# Patient Record
Sex: FEMALE | Race: BLACK OR AFRICAN AMERICAN | NOT HISPANIC OR LATINO | Employment: FULL TIME | URBAN - METROPOLITAN AREA
[De-identification: names, ages, dates, MRNs, and addresses within clinical notes are randomized per-mention and may not be internally consistent; named-entity substitution may affect disease eponyms.]

---

## 2023-10-04 ENCOUNTER — HOSPITAL ENCOUNTER (OUTPATIENT)
Dept: RADIOLOGY | Facility: HOSPITAL | Age: 58
Discharge: HOME | End: 2023-10-04
Attending: INTERNAL MEDICINE
Payer: COMMERCIAL

## 2023-10-04 ENCOUNTER — OFFICE VISIT (OUTPATIENT)
Dept: RHEUMATOLOGY | Facility: CLINIC | Age: 58
End: 2023-10-04
Payer: COMMERCIAL

## 2023-10-04 VITALS
HEIGHT: 65 IN | WEIGHT: 178.56 LBS | HEART RATE: 83 BPM | DIASTOLIC BLOOD PRESSURE: 65 MMHG | BODY MASS INDEX: 29.75 KG/M2 | SYSTOLIC BLOOD PRESSURE: 135 MMHG | RESPIRATION RATE: 18 BRPM

## 2023-10-04 DIAGNOSIS — Z79.4 INSULIN DEPENDENT TYPE 2 DIABETES MELLITUS: ICD-10-CM

## 2023-10-04 DIAGNOSIS — M46.1 SACROILIITIS: ICD-10-CM

## 2023-10-04 DIAGNOSIS — E11.9 INSULIN DEPENDENT TYPE 2 DIABETES MELLITUS: ICD-10-CM

## 2023-10-04 DIAGNOSIS — M06.4 UNDIFFERENTIATED INFLAMMATORY ARTHRITIS: Primary | ICD-10-CM

## 2023-10-04 PROBLEM — M19.90 UNDIFFERENTIATED INFLAMMATORY ARTHRITIS: Status: ACTIVE | Noted: 2023-10-04

## 2023-10-04 PROCEDURE — 72202 XR SACROILIAC JOINTS COMPLETE: ICD-10-PCS | Mod: 26,,, | Performed by: RADIOLOGY

## 2023-10-04 PROCEDURE — 99244 OFF/OP CNSLTJ NEW/EST MOD 40: CPT | Mod: S$GLB,,, | Performed by: INTERNAL MEDICINE

## 2023-10-04 PROCEDURE — 99999 PR PBB SHADOW E&M-EST. PATIENT-LVL IV: ICD-10-PCS | Mod: PBBFAC,,, | Performed by: INTERNAL MEDICINE

## 2023-10-04 PROCEDURE — 99999 PR PBB SHADOW E&M-EST. PATIENT-LVL IV: CPT | Mod: PBBFAC,,, | Performed by: INTERNAL MEDICINE

## 2023-10-04 PROCEDURE — 99244 PR OFFICE CONSULTATION,LEVEL IV: ICD-10-PCS | Mod: S$GLB,,, | Performed by: INTERNAL MEDICINE

## 2023-10-04 PROCEDURE — 72202 X-RAY EXAM SI JOINTS 3/> VWS: CPT | Mod: 26,,, | Performed by: RADIOLOGY

## 2023-10-04 PROCEDURE — 72202 X-RAY EXAM SI JOINTS 3/> VWS: CPT | Mod: TC

## 2023-10-04 NOTE — PROGRESS NOTES
RHEUMATOLOGY CLINIC INITIAL VISIT    Reason for consult:-  Elevated C-reactive protein and sed rate.  Joint pain.  Back pain.  Chief complaints, HPI, ROS, EXAM, Assessment & Plans:-  Brissa Higginbotham a 58 y.o. pleasant female comes in with worsening back pain and joint pain.  Longstanding history of lower back pain without associated radiculopathy symptoms.  Have been noticing worsening diffuse pain involving small and large joints for the past year.  Workup showed elevated sed rate and CRP and was referred here for further workup.  No photosensitive malar rash, sicca syndrome or Raynaud's phenomenon.  Both mechanical and inflammatory back pain present.  No history of uveitis.  No history of sarcoidosis.  Rheumatological review of system negative otherwise.  Physical examination shows severe tenderness of soft tissues and joints.  No significant synovitis of small joints.    1. Undifferentiated inflammatory arthritis    2. Sacroiliitis    3. Insulin dependent type 2 diabetes mellitus      Problem List Items Addressed This Visit       Undifferentiated inflammatory arthritis - Primary    Relevant Orders    MAU Screen w/Reflex    CBC Auto Differential    Cyclic Citrullinated Peptide Antibody, IgG    Comprehensive Metabolic Panel    C-Reactive Protein    Sedimentation rate    Rheumatoid Factor    HLA B27 ANTIGEN    HEMOGLOBIN A1C    Sacroiliitis    Relevant Orders    MAU Screen w/Reflex    CBC Auto Differential    Cyclic Citrullinated Peptide Antibody, IgG    Comprehensive Metabolic Panel    C-Reactive Protein    Sedimentation rate    Rheumatoid Factor    HLA B27 ANTIGEN    X-Ray Sacroiliac Joints Complete (Completed)    Insulin dependent type 2 diabetes mellitus    Relevant Orders    HEMOGLOBIN A1C       Inflammatory polyarthralgia associated with mechanical and inflammatory back pain.  Recently rheumatoid to have elevated inflammatory markers.  External records reviewed.  MRI shows degenerative disc disease of lower  lumbar spine.  Repeat inflammatory markers.  Check x-ray of sacroiliac joint to look for any deformity.  Consider MRI of sacroiliac joint if any abnormality found on x-ray.  I have explained all of the above in detail and the patient understands all of the current recommendation(s). I have answered all questions to the best of my ability and to their complete satisfaction.           # Follow up in about 4 weeks (around 11/1/2023).      History reviewed. No pertinent past medical history.    History reviewed. No pertinent surgical history.     Social History     Tobacco Use    Smoking status: Never   Substance Use Topics    Alcohol use: Yes     Alcohol/week: 0.0 standard drinks of alcohol     Comment: Occasionally, mainly on weekends    Drug use: No       Family History   Problem Relation Age of Onset    Diabetes Mother     Hypertension Mother     Stroke Mother     Diabetes Daughter     Diabetes Maternal Aunt     Breast cancer Sister        Review of patient's allergies indicates:   Allergen Reactions    Doxycycline Itching, Swelling and Rash    Zithromax [azithromycin] Hives, Itching and Swelling       Medication List with Changes/Refills   Current Medications    INSULIN  UNIT/ML INJECTION    Inject into the skin 2 (two) times daily before meals.    INSULIN NPH-INSULIN REGULAR, 70/30, 100 UNIT/ML (70-30) INJECTION    Humulin 70/30 U-100 Insulin 100 unit/mL subcutaneous suspension   Inject 30 units twice a day by subcutaneous route.    NYSTATIN (MYCOSTATIN) CREAM    Apply topically 2 (two) times daily.    TRUE METRIX GLUCOSE TEST STRIP STRP    USE 1 STRIP TO CHECK GLUCOSE 3 TIMES A DAY         Thank you for allowing me to participate in the care ofBrissa Lopez.    Disclaimer: This note was prepared using voice recognition system and is likely to have sound alike errors and is not proof read.  Please call me with any questions.

## 2023-10-23 ENCOUNTER — TELEPHONE (OUTPATIENT)
Dept: RHEUMATOLOGY | Facility: CLINIC | Age: 58
End: 2023-10-23
Payer: COMMERCIAL

## 2023-10-23 ENCOUNTER — NURSE TRIAGE (OUTPATIENT)
Dept: ADMINISTRATIVE | Facility: CLINIC | Age: 58
End: 2023-10-23
Payer: COMMERCIAL

## 2023-10-23 NOTE — TELEPHONE ENCOUNTER
OOC RN  Dr. Reilly,  Patient call and states she felt faint and call disconnect, on the Triage que. Intermittent Lightheaded and Pain left side under breast.  Told Dr. Reilly about Breast.   Told her to get her liver and ab checked out.with PCP   A couple of weeks.  Denies lightheadedness at this time.  Wants PCP appt for pain under breast that she was told to follow up with a month ago.     I saw appt today tfor rheum,  she did not want to talk about that and said I was not helping her and hung up.       Reason for Disposition   Triager unable to complete call (e.g., caller continues to be abusive or caller hangs up)    Additional Information   Negative: Violent behavior, or threatening to physically hurt or kill someone   Negative: Caller is very confused and no other adult (e.g., friend or family member) available   Negative: Sounds like a life-threatening emergency to the triager   Negative: Child abuse suspected   Negative: Elder or vulnerable adult abuse suspected   Negative: Suicide thoughts, threats, attempts, or questions   Negative: Patient sounds very sick or weak to the triager   Negative: [place] worried caller and second call within 4 hours about the same medical problem   Negative: [place] worried caller and third call within 48 hours about the same medical problem   Negative: [place] worried caller and can't be reassured by triager   Negative: Caller demands to speak with the PCP and about sick adult (or sick caller)   Negative: Angry or rude caller and doesn't respond to 5 minutes of triager counseling and sick adult (or caller)   Negative: Patient wants to be seen   Negative: Caller mainly has complaints about past medical care, billing, etc. and has mild symptoms or no symptoms   Negative: Difficult caller responded to triager counseling    Protocols used: Difficult Call-A-[place]

## 2023-10-23 NOTE — TELEPHONE ENCOUNTER
----- Message from Nesha Abdullahi sent at 10/23/2023  8:27 AM CDT -----  Regarding: same day appt requested  Name of Who is Calling:JESS LIND [[id number]]          What is the request in detail: same day appt requested , pt states she's in pain and is unable to walk           Can the clinic reply by [facility]: no           What Number to Call Back if not [facility]: [phone] (home)

## 2023-10-25 ENCOUNTER — OFFICE VISIT (OUTPATIENT)
Dept: RHEUMATOLOGY | Facility: CLINIC | Age: 58
End: 2023-10-25
Payer: COMMERCIAL

## 2023-10-25 VITALS
BODY MASS INDEX: 29.2 KG/M2 | DIASTOLIC BLOOD PRESSURE: 71 MMHG | HEART RATE: 79 BPM | SYSTOLIC BLOOD PRESSURE: 124 MMHG | HEIGHT: 65 IN | WEIGHT: 175.25 LBS

## 2023-10-25 DIAGNOSIS — M06.4 UNDIFFERENTIATED INFLAMMATORY ARTHRITIS: ICD-10-CM

## 2023-10-25 DIAGNOSIS — M46.1 SACROILIITIS: Primary | ICD-10-CM

## 2023-10-25 DIAGNOSIS — M54.17 LUMBOSACRAL RADICULOPATHY: ICD-10-CM

## 2023-10-25 PROCEDURE — 99214 PR OFFICE/OUTPT VISIT, EST, LEVL IV, 30-39 MIN: ICD-10-PCS | Mod: S$GLB,,, | Performed by: STUDENT IN AN ORGANIZED HEALTH CARE EDUCATION/TRAINING PROGRAM

## 2023-10-25 PROCEDURE — 99214 OFFICE O/P EST MOD 30 MIN: CPT | Mod: S$GLB,,, | Performed by: STUDENT IN AN ORGANIZED HEALTH CARE EDUCATION/TRAINING PROGRAM

## 2023-10-25 PROCEDURE — 99999 PR PBB SHADOW E&M-EST. PATIENT-LVL IV: CPT | Mod: PBBFAC,,, | Performed by: STUDENT IN AN ORGANIZED HEALTH CARE EDUCATION/TRAINING PROGRAM

## 2023-10-25 PROCEDURE — 99999 PR PBB SHADOW E&M-EST. PATIENT-LVL IV: ICD-10-PCS | Mod: PBBFAC,,, | Performed by: STUDENT IN AN ORGANIZED HEALTH CARE EDUCATION/TRAINING PROGRAM

## 2023-10-25 RX ORDER — INSULIN GLARGINE 100 [IU]/ML
INJECTION, SOLUTION SUBCUTANEOUS
COMMUNITY

## 2023-10-25 RX ORDER — CELECOXIB 100 MG/1
100 CAPSULE ORAL 2 TIMES DAILY
Qty: 60 CAPSULE | Refills: 2 | Status: SHIPPED | OUTPATIENT
Start: 2023-10-25 | End: 2024-02-19

## 2023-10-25 RX ORDER — SYRING-NEEDL,DISP,INSUL,0.3 ML 31GX15/64"
SYRINGE, EMPTY DISPOSABLE MISCELLANEOUS
COMMUNITY
Start: 2023-07-18

## 2023-10-25 RX ORDER — GABAPENTIN 300 MG/1
CAPSULE ORAL
COMMUNITY

## 2023-10-25 RX ORDER — IBUPROFEN 800 MG/1
800 TABLET ORAL EVERY 8 HOURS PRN
COMMUNITY
Start: 2023-09-14 | End: 2023-10-25 | Stop reason: ALTCHOICE

## 2023-10-25 NOTE — LETTER
October 25, 2023    Brissa Lopez  [address] [facility] [address]             [facility] 4th Fl  [address]  [place] [address]  Phone: [phone]  Fax: [phone] To whom it may concern,    Patient is having flare of pain and was evaluated by us in clinic.  Recommend rest and return to work on November 1st.          If you have any questions or concerns, please don't hesitate to call.    Sincerely,        Abe Rob MD

## 2023-11-04 NOTE — PROGRESS NOTES
RHEUMATOLOGY CLINIC ESTABLISHED PATIENT VISIT    Reason for consult:- undifferentiated inflammatory arthritis    Chief complaints, HPI, ROS, EXAM, Assessment & Plans:-    Brissa Lopez is a 58 y.o. pleasant female who presents to follow-up from her recent visit with Dr. JACOB on October 4th.  She is been having back pain.  At that visit he did workup including  x-ray of sacroiliac joints which was unremarkable.  She had normal inflammatory markers, negative MAU, negative RF/CCP and negative HLA B27 antigen.  She does have degenerative findings of lumbar spine.  She did physical therapy which he did not find particularly beneficial.  Rheumatologic review of systems otherwise negative.  Physical exam is unremarkable.  No synovitis, dactylitis or enthesitis.    Reviewed all available old and outside pertinent medical records.    All lab results personally reviewed and interpreted by me.    1. Sacroiliitis    2. Lumbosacral radiculopathy        Problem List Items Addressed This Visit          Orthopedic    Sacroiliitis - Primary    Relevant Medications    celecoxib (CELEBREX) 100 MG capsule    Other Relevant Orders    MRI Sacroiliac Joint W W/O Contrast    Ambulatory referral/consult to Pain Clinic     Other Visit Diagnoses       Lumbosacral radiculopathy        Relevant Medications    celecoxib (CELEBREX) 100 MG capsule    Other Relevant Orders    MRI Sacroiliac Joint W W/O Contrast    Ambulatory referral/consult to Pain Clinic            Patient following up from recent visit for chronic back pain  Previous workup unrevealing for inflammatory arthritis  We will obtain MRI of sacroiliac joints  Referral to Interventional pain for lumbosacral radiculopathy    # No follow-ups on file.    Chronic comorbid conditions affecting medical decision making today:    Past Medical History:   Diagnosis Date    Diabetes mellitus     Undifferentiated inflammatory arthritis        Past Surgical History:   Procedure Laterality Date     " SECTION      fibroid removal      HYSTERECTOMY          Social History     Tobacco Use    Smoking status: Never   Substance Use Topics    Alcohol use: Yes     Alcohol/week: 0.0 standard drinks of alcohol     Comment: Occasionally, mainly on weekends    Drug use: No       Family History   Problem Relation Age of Onset    Diabetes Mother     Hypertension Mother     Stroke Mother     Diabetes Daughter     Diabetes Maternal Aunt     Breast cancer Sister        Review of patient's allergies indicates:   Allergen Reactions    Doxycycline Itching, Swelling and Rash    Metformin      Other reaction(s): Not available    Zithromax [azithromycin] Hives, Itching and Swelling       Medication List with Changes/Refills   New Medications    CELECOXIB (CELEBREX) 100 MG CAPSULE    Take 1 capsule (100 mg total) by mouth 2 (two) times daily.   Current Medications    BD VEO INSULIN SYRINGE UF 0.3 ML 31 GAUGE X 15/64" SYRG    USE TWICE A DAY AS DIRECTED    GABAPENTIN (NEURONTIN) 300 MG CAPSULE    Take 1 capsule twice a day by oral route.    INSULIN (LANTUS SOLOSTAR U-100 INSULIN) GLARGINE 100 UNITS/ML SUBQ PEN    30 units every 24 hours by sub-q route.    INSULIN  UNIT/ML INJECTION    Inject into the skin 2 (two) times daily before meals.    INSULIN NPH-INSULIN REGULAR, 70/30, 100 UNIT/ML (70-30) INJECTION    Humulin 70/30 U-100 Insulin 100 unit/mL subcutaneous suspension   Inject 30 units twice a day by subcutaneous route.    TRUE METRIX GLUCOSE TEST STRIP STRP    USE 1 STRIP TO CHECK GLUCOSE 3 TIMES A DAY   Discontinued Medications    IBUPROFEN (ADVIL,MOTRIN) 800 MG TABLET    Take 800 mg by mouth every 8 (eight) hours as needed.    NYSTATIN (MYCOSTATIN) CREAM    Apply topically 2 (two) times daily.         Disclaimer: This note was prepared using voice recognition system and is likely to have sound alike errors and is not proofread.  Please message me with any questions.    32 minutes of total time spent on the " encounter, which includes face to face time and non-face to face time preparing to see the patient (eg, review of tests), Obtaining and/or reviewing separately obtained history, Documenting clinical information in the electronic or other health record, Independently interpreting results (not separately reported) and communicating results to the patient/family/caregiver, or Care coordination (not separately reported).     Thank you for allowing me to participate in the care of Brissa JESUS Lopez.    Abe Rob MD

## 2023-11-15 ENCOUNTER — TELEPHONE (OUTPATIENT)
Dept: RHEUMATOLOGY | Facility: CLINIC | Age: 58
End: 2023-11-15
Payer: COMMERCIAL

## 2023-11-15 NOTE — TELEPHONE ENCOUNTER
Patient aware labs showed Normal inflammation levels when repeated.  No evidence of autoimmune disease.       Xray normal.  Patient advised to schedule MRI that was ordered by Dr Rob on 10/25/2023

## 2023-11-15 NOTE — TELEPHONE ENCOUNTER
----- Message from Noytate De Lunary sent at 11/15/2023  2:09 PM CST -----  Regarding: pt call back  Name of Who is Calling: pt        What is the request in detail: pt is calling for test and xray results and would like a call back to discuss, please advise.        Can the clinic reply by [facility]: no          What Number to Call Back if not in [facility]: [phone]

## 2023-12-11 ENCOUNTER — TELEPHONE (OUTPATIENT)
Dept: PAIN MEDICINE | Facility: CLINIC | Age: 58
End: 2023-12-11
Payer: COMMERCIAL

## 2023-12-20 ENCOUNTER — TELEPHONE (OUTPATIENT)
Dept: RHEUMATOLOGY | Facility: CLINIC | Age: 58
End: 2023-12-20
Payer: COMMERCIAL

## 2023-12-20 ENCOUNTER — HOSPITAL ENCOUNTER (OUTPATIENT)
Dept: RADIOLOGY | Facility: HOSPITAL | Age: 58
Discharge: HOME | End: 2023-12-20
Attending: STUDENT IN AN ORGANIZED HEALTH CARE EDUCATION/TRAINING PROGRAM
Payer: COMMERCIAL

## 2023-12-20 ENCOUNTER — DOCUMENTATION ONLY (OUTPATIENT)
Dept: INFUSION THERAPY | Facility: HOSPITAL | Age: 58
End: 2023-12-20
Payer: COMMERCIAL

## 2023-12-20 DIAGNOSIS — M46.1 SACROILIITIS: ICD-10-CM

## 2023-12-20 DIAGNOSIS — M54.17 LUMBOSACRAL RADICULOPATHY: ICD-10-CM

## 2023-12-20 PROCEDURE — 72197 MRI SACROILIAC JOINTS W W/O CONTRAST: ICD-10-PCS | Mod: 26,,, | Performed by: RADIOLOGY

## 2023-12-20 PROCEDURE — 72197 MRI PELVIS W/O & W/DYE: CPT | Mod: TC

## 2023-12-20 PROCEDURE — 25500020 PHARM REV CODE 255: Performed by: STUDENT IN AN ORGANIZED HEALTH CARE EDUCATION/TRAINING PROGRAM

## 2023-12-20 PROCEDURE — A9585 GADOBUTROL INJECTION: HCPCS | Performed by: STUDENT IN AN ORGANIZED HEALTH CARE EDUCATION/TRAINING PROGRAM

## 2023-12-20 PROCEDURE — 72197 MRI PELVIS W/O & W/DYE: CPT | Mod: 26,,, | Performed by: RADIOLOGY

## 2023-12-20 RX ORDER — GADOBUTROL 604.72 MG/ML
8 INJECTION INTRAVENOUS
Status: COMPLETED | OUTPATIENT
Start: 2023-12-20 | End: 2023-12-20

## 2023-12-20 RX ADMIN — GADOBUTROL 8 ML: 604.72 INJECTION INTRAVENOUS at 12:12

## 2023-12-20 NOTE — TELEPHONE ENCOUNTER
Lab reported to Jaycee in infusion Glucose 451.      Left message for patient to return call  Per Dr Rob if she has any signs/symptoms of hyperglycemia she needs to go to ER.  If not she needs to follow up with PCP ASAP to discuss blood glucose

## 2023-12-20 NOTE — TELEPHONE ENCOUNTER
"Ashely spoke with patient today at 1:17     Informed patient of her glucose being 451.      Per Dr. Rob     "if she has any signs/symptoms of hyperglycemia she needs to go to ER.  If not she needs to follow up with PCP ASAP to discuss blood glucose "           "

## 2023-12-20 NOTE — TELEPHONE ENCOUNTER
"Informed patient of her glucose being 451.     Per Dr. Rob    "if she has any signs/symptoms of hyperglycemia she needs to go to ER.  If not she needs to follow up with PCP ASAP to discuss blood glucose "      Patient verbalized understanding and was grateful for the call_DD  "

## 2023-12-20 NOTE — TELEPHONE ENCOUNTER
----- Message from Elliott Nguyen MD sent at 12/20/2023 12:32 PM CST -----  Extremely Elevated blood glucose level.  Please request patient to contact primary care physician or go to nearest emergency room.  Thanks.

## 2024-01-22 ENCOUNTER — OFFICE VISIT (OUTPATIENT)
Dept: OBSTETRICS AND GYNECOLOGY | Facility: CLINIC | Age: 59
End: 2024-01-22
Payer: COMMERCIAL

## 2024-01-22 VITALS
WEIGHT: 170.88 LBS | BODY MASS INDEX: 28.47 KG/M2 | SYSTOLIC BLOOD PRESSURE: 118 MMHG | HEIGHT: 65 IN | DIASTOLIC BLOOD PRESSURE: 58 MMHG

## 2024-01-22 DIAGNOSIS — L02.211 ABSCESS OF SKIN OF ABDOMEN: ICD-10-CM

## 2024-01-22 DIAGNOSIS — N76.0 ACUTE VAGINITIS: ICD-10-CM

## 2024-01-22 DIAGNOSIS — Z01.419 ENCOUNTER FOR ANNUAL ROUTINE GYNECOLOGICAL EXAMINATION: Primary | ICD-10-CM

## 2024-01-22 DIAGNOSIS — B37.31 CANDIDIASIS OF VULVA AND VAGINA: ICD-10-CM

## 2024-01-22 DIAGNOSIS — Z12.31 ENCOUNTER FOR SCREENING MAMMOGRAM FOR MALIGNANT NEOPLASM OF BREAST: ICD-10-CM

## 2024-01-22 PROCEDURE — 99999 PR PBB SHADOW E&M-EST. PATIENT-LVL III: CPT | Mod: PBBFAC,,,

## 2024-01-22 PROCEDURE — 99386 PREV VISIT NEW AGE 40-64: CPT | Mod: S$GLB,,,

## 2024-01-22 PROCEDURE — 81514 NFCT DS BV&VAGINITIS DNA ALG: CPT

## 2024-01-22 RX ORDER — NYSTATIN AND TRIAMCINOLONE ACETONIDE 100000; 1 [USP'U]/G; MG/G
CREAM TOPICAL
Qty: 30 G | Refills: 1 | Status: SHIPPED | OUTPATIENT
Start: 2024-01-22

## 2024-01-22 RX ORDER — SULFAMETHOXAZOLE AND TRIMETHOPRIM 800; 160 MG/1; MG/1
1 TABLET ORAL 2 TIMES DAILY
Qty: 14 TABLET | Refills: 0 | Status: SHIPPED | OUTPATIENT
Start: 2024-01-22 | End: 2024-01-29

## 2024-01-22 RX ORDER — FLUCONAZOLE 150 MG/1
150 TABLET ORAL
Qty: 2 TABLET | Refills: 0 | Status: SHIPPED | OUTPATIENT
Start: 2024-01-22 | End: 2024-01-26

## 2024-01-22 RX ORDER — MUPIROCIN 20 MG/G
OINTMENT TOPICAL 3 TIMES DAILY
Qty: 30 G | Refills: 1 | Status: SHIPPED | OUTPATIENT
Start: 2024-01-22

## 2024-01-22 NOTE — LETTER
January 22, 2024      [facility] 2nd Fl  [address] [facility]  [place] [address]  Phone: [phone]  Fax: [phone]       Patient: Brissa Lopez   YOB: 1965  Date of Visit: 01/22/2024    To Whom It May Concern:    Brissa Lopez  was at [facility] on 01/22/2024. The patient may return to work/school on 1/23/2024 with no restrictions. If you have any questions or concerns, or if I can be of further assistance, please do not hesitate to contact me.    Sincerely,    Iza Ortiz NP

## 2024-01-22 NOTE — PROGRESS NOTES
"Subjective:       Patient ID: Brissa Lopez is a 58 y.o. female.    Chief Complaint:  Vaginitis      History of Present Illness  HPI  Annual Exam-Postmenopausal  Patient presents for annual exam. The patient has complaints today of vaginal irritation and itching for several weeks. Patient also complains of a "boil" to the right lower abdomen. The patient is not currently sexually active, no intercourse in over a year. GYN screening history: last pap: approximate date 23 and was normal and last mammogram: approximate date 23 and was normal. The patient has never been taking hormone replacement therapy. Patient denies post-menopausal vaginal bleeding. Hysterectomy several years ago for benign indications, unsure if ovaries intact  The patient wears seatbelts: no. The patient participates in regular exercise: no. Has the patient ever been transfused or tattooed?: no. The patient reports that there is not domestic violence in her life.    Type 2 diabetic, last A1c 8.8    Works in housekeeping at [employer]    GYN & OB History  Patient's last menstrual period was 2013.   Date of Last Pap: No result found    OB History    Para Term  AB Living   2 2 2     2   SAB IAB Ectopic Multiple Live Births           2      # Outcome Date GA Lbr James/2nd Weight Sex Delivery Anes PTL Lv   2 Term      Vag-Spont   TETO   1 Term      Vag-Spont   TETO       Review of Systems  Review of Systems   Constitutional:  Negative for appetite change, fatigue, fever and unexpected weight change.   Eyes:  Negative for visual disturbance.   Cardiovascular:  Negative for chest pain.   Gastrointestinal:  Negative for abdominal pain, bloating, constipation, diarrhea, nausea and vomiting.   Genitourinary:  Positive for vaginal pain (irritation and itching). Negative for bladder incontinence, decreased libido, dysmenorrhea, dyspareunia, dysuria, flank pain, frequency, genital sores, hot flashes, menorrhagia, menstrual problem, pelvic " pain, urgency, vaginal bleeding, vaginal discharge, postcoital bleeding, postmenopausal bleeding, vaginal dryness and vaginal odor.   Integumentary:  Positive for rash and mole/lesion. Negative for acne, breast mass, nipple discharge, breast skin changes and breast tenderness.   Neurological:  Negative for syncope and headaches.   Hematological:  Negative for adenopathy. Does not bruise/bleed easily.   Psychiatric/Behavioral:  Negative for sleep disturbance.    All other systems reviewed and are negative.  Breast: Positive for breast self exam.Negative for asymmetry, lump, mass, nipple discharge, skin changes and tenderness          Objective:      Physical Exam:   Constitutional: She is oriented to person, place, and time. She appears well-developed and well-nourished.    HENT:   Head: Normocephalic and atraumatic.   Nose: Nose normal.    Eyes: Pupils are equal, round, and reactive to light. Conjunctivae and EOM are normal.     Cardiovascular:  Normal rate and regular rhythm.             Pulmonary/Chest: Effort normal. She has no rhonchi.   Deferred per patient         Abdominal: Soft. There is no rebound and no guarding.     Genitourinary:    Inguinal canal and rectum normal.      Pelvic exam was performed with patient in the lithotomy position.   No external genitalia lesions identified,Genitalia hair distrobution normal .   There is rash and tenderness on the right labia. There is rash and tenderness on the left labia. There is erythema and tenderness in the vagina. No vaginal discharge or bleeding in the vagina. Vaginal atrophy noted. Cervix is absent.Uterus is absent. Normal urethral meatus.          Musculoskeletal: Normal range of motion and moves all extremeties.       Neurological: She is alert and oriented to person, place, and time.    Skin: Skin is warm and dry. Abscess noted.        Tender 2 cm abscess to right lower quadrant     Psychiatric: She has a normal mood and affect. Her speech is normal and  behavior is normal. Mood, judgment and thought content normal.             Assessment:        1. Encounter for annual routine gynecological examination    2. Encounter for screening mammogram for malignant neoplasm of breast    3. Acute vaginitis    4. Candidiasis of vulva and vagina    5. Abscess of skin of abdomen               Plan:   Continue annual well woman exam.  Reviewed updated recommendations for pap smears (no pap smear needed) in patients with a hysterectomy for benign indications.   Patient needs a pelvic exam every year.  Reviewed recommendations for annual CBE and annual MMG.   Mammogram scheduled  Ensure adequate calcium and vitamin D intake and daily weight bearing exercises.  Encouraged diet, exercise, and weight loss      Diagnosis and orders this visit:  Encounter for annual routine gynecological examination    Encounter for screening mammogram for malignant neoplasm of breast  -     Mammo Digital Screening Bilat; Future; Expected date: 01/29/2024    Acute vaginitis  -     Vaginosis Screen by DNA Probe    Candidiasis of vulva and vagina  -     fluconazole (DIFLUCAN) 150 MG Tab; Take 1 tablet (150 mg total) by mouth every 72 hours. for 2 doses  Dispense: 2 tablet; Refill: 0  -     nystatin-triamcinolone (MYCOLOG II) cream; Apply to affected area 2 times daily  Dispense: 30 g; Refill: 1    Abscess of skin of abdomen  -     sulfamethoxazole-trimethoprim 800-160mg (BACTRIM DS) 800-160 mg Tab; Take 1 tablet by mouth 2 (two) times daily. for 7 days  Dispense: 14 tablet; Refill: 0  -     mupirocin (BACTROBAN) 2 % ointment; Apply topically 3 (three) times daily. To abdomen  Dispense: 30 g; Refill: 1      Iza Ortiz NP

## 2024-01-23 DIAGNOSIS — N76.0 BACTERIAL VAGINITIS: ICD-10-CM

## 2024-01-23 DIAGNOSIS — B96.89 BACTERIAL VAGINITIS: ICD-10-CM

## 2024-01-23 DIAGNOSIS — B37.31 VAGINAL CANDIDIASIS: Primary | ICD-10-CM

## 2024-01-23 LAB
BACTERIAL VAGINOSIS DNA: POSITIVE
CANDIDA GLABRATA DNA: NEGATIVE
CANDIDA KRUSEI DNA: NEGATIVE
CANDIDA RRNA VAG QL PROBE: POSITIVE
T VAGINALIS RRNA GENITAL QL PROBE: NEGATIVE

## 2024-01-23 RX ORDER — FLUCONAZOLE 150 MG/1
150 TABLET ORAL
Qty: 2 TABLET | Refills: 0 | Status: SHIPPED | OUTPATIENT
Start: 2024-01-23 | End: 2024-01-27

## 2024-01-23 RX ORDER — METRONIDAZOLE 500 MG/1
500 TABLET ORAL 2 TIMES DAILY
Qty: 14 TABLET | Refills: 0 | Status: SHIPPED | OUTPATIENT
Start: 2024-01-23 | End: 2024-01-30

## 2024-01-31 ENCOUNTER — OFFICE VISIT (OUTPATIENT)
Dept: OBSTETRICS AND GYNECOLOGY | Facility: CLINIC | Age: 59
End: 2024-01-31
Payer: COMMERCIAL

## 2024-01-31 VITALS — WEIGHT: 175.5 LBS | BODY MASS INDEX: 29.2 KG/M2 | SYSTOLIC BLOOD PRESSURE: 112 MMHG | DIASTOLIC BLOOD PRESSURE: 78 MMHG

## 2024-01-31 DIAGNOSIS — L02.211 ABSCESS OF SKIN OF ABDOMEN: Primary | ICD-10-CM

## 2024-01-31 PROCEDURE — 87147 CULTURE TYPE IMMUNOLOGIC: CPT | Performed by: NURSE PRACTITIONER

## 2024-01-31 PROCEDURE — 87077 CULTURE AEROBIC IDENTIFY: CPT | Performed by: NURSE PRACTITIONER

## 2024-01-31 PROCEDURE — 99999 PR PBB SHADOW E&M-EST. PATIENT-LVL III: CPT | Mod: PBBFAC,,, | Performed by: NURSE PRACTITIONER

## 2024-01-31 PROCEDURE — 87075 CULTR BACTERIA EXCEPT BLOOD: CPT | Performed by: NURSE PRACTITIONER

## 2024-01-31 PROCEDURE — 87070 CULTURE OTHR SPECIMN AEROBIC: CPT | Performed by: NURSE PRACTITIONER

## 2024-01-31 PROCEDURE — 99213 OFFICE O/P EST LOW 20 MIN: CPT | Mod: S$GLB,,, | Performed by: NURSE PRACTITIONER

## 2024-01-31 RX ORDER — CIPROFLOXACIN 500 MG/1
500 TABLET ORAL EVERY 12 HOURS
Qty: 14 TABLET | Refills: 0 | Status: SHIPPED | OUTPATIENT
Start: 2024-01-31 | End: 2024-02-07

## 2024-01-31 NOTE — PROGRESS NOTES
Subjective:       Patient ID: Brissa Lopez is a 58 y.o. female.    Chief Complaint:  bump on skin      History of Present Illness  HPI   present for f/u  Was seen and dx with an abscess two weeks ago  Rx bactrim and bactroban  Stopped used bactroban after  days; felt like it made it worse  Now feels like it is a spider bite  No fever, fatigue, chills  Diabetic; not checking her sugars    GYN & OB History  Patient's last menstrual period was 2013.   Date of Last Pap: No result found    OB History    Para Term  AB Living   2 2 2     2   SAB IAB Ectopic Multiple Live Births           2      # Outcome Date GA Lbr James/2nd Weight Sex Delivery Anes PTL Lv   2 Term      Vag-Spont   TETO   1 Term      Vag-Spont   TETO       Review of Systems  Review of Systems   Constitutional:  Negative for chills, fatigue and fever.   Integumentary:         abscess           Objective:      Physical Exam:   Constitutional: She is oriented to person, place, and time. She appears well-developed and well-nourished. No distress.    HENT:   Head: Normocephalic and atraumatic.    Eyes: Pupils are equal, round, and reactive to light. Conjunctivae and EOM are normal.      Pulmonary/Chest: Effort normal.        Abdominal:                 Musculoskeletal: Normal range of motion and moves all extremeties.       Neurological: She is alert and oriented to person, place, and time.    Skin: Skin is warm and dry. No rash noted. She is not diaphoretic. No erythema. No pallor.    Psychiatric: She has a normal mood and affect. Her behavior is normal. Judgment and thought content normal.             Assessment:        1. Abscess of skin of abdomen               Plan:   Cx collected  Encouraged frequent hand hygiene and avoid touching if possible  Consulted with Dr. Franco -- instructed pt to d/c bactrim and switch to cipro  F/u with PCP -- pt has appt scheduled for 2024    If cellulitis starts to improve report to ED for  further management    Continue annual well woman exam.    Abscess of skin of abdomen  -     Aerobic culture  -     Culture, Anaerobic  -     ciprofloxacin HCl (CIPRO) 500 MG tablet; Take 1 tablet (500 mg total) by mouth every 12 (twelve) hours. for 7 days  Dispense: 14 tablet; Refill: 0

## 2024-02-03 LAB — BACTERIA SPEC AEROBE CULT: ABNORMAL

## 2024-02-05 ENCOUNTER — TELEPHONE (OUTPATIENT)
Dept: OBSTETRICS AND GYNECOLOGY | Facility: CLINIC | Age: 59
End: 2024-02-05
Payer: COMMERCIAL

## 2024-02-05 DIAGNOSIS — B95.1 GROUP BETA STREP POSITIVE: Primary | ICD-10-CM

## 2024-02-05 LAB — BACTERIA SPEC ANAEROBE CULT: NORMAL

## 2024-02-05 RX ORDER — AMOXICILLIN AND CLAVULANATE POTASSIUM 875; 125 MG/1; MG/1
1 TABLET, FILM COATED ORAL 2 TIMES DAILY
Qty: 14 TABLET | Refills: 0 | Status: SHIPPED | OUTPATIENT
Start: 2024-02-05 | End: 2024-02-12

## 2024-02-05 NOTE — TELEPHONE ENCOUNTER
----- Message from Myra Hoover MA sent at 2/5/2024  2:22 PM CST -----  Contact: [email or web address]@ [phone]  Pt called                In regards to needing to speak with provider about lab results.

## 2024-02-05 NOTE — PROGRESS NOTES
Attempted to contact patient in regards to results of Aerobic Culture. No answer, left callback message.

## 2024-02-05 NOTE — TELEPHONE ENCOUNTER
Returned pt phone call confirmed pt identifiers informed pt of vaginal culture results and that new RX was prescribed in place of old one per provider pt can stop cipro and start Augmentin  twice a day for the next 7 days and to keep area clean and dry and make sure to wash hands frequently. Pt verbalized understanding.

## 2024-02-15 NOTE — PROGRESS NOTES
"New Patient Chronic Pain Note (Initial Visit)    Referring Physician: Self, Aaareferral    PCP: Erica Reilly MD    Chief Complaint: back and leg pain       SUBJECTIVE:    Brissa Lopez is a 58 y.o. female with past medical history significant for hirsutism, uterine fibroids, type 2 diabetes, obesity who presents to the clinic for the evaluation of diffuse pain including pain in her abdomen, breasts ," lower back and legs.  Today she reports pain in the lower back which radiates down the lateral and posterior aspects of bilateral lower extremities in L4-S1 distribution to the feet.  Pain is intermittent and today is rated a 10/10.  Patient reports her pain has been present for 1.5 years without inciting accident injury or trauma.  Pain is described as sharp, stabbing, aching, burning in nature.  Pain is exacerbated with prolonged standing or ambulation exceeding 10-15 minutes.  She does endorse associated weakness in the lower extremities associated with her pain.  Pain is improved with sitting.  Patient is currently taking gabapentin 300 mg twice daily per her PCP, Dr. Reilly but does not report noticeable improvement on this dosage.  She has not received prior interventional treatment.  Patient reports completing twice weekly sessions of physical therapy for 6 months, approximately 5 months prior at [facility].    Patient reports significant motor weakness and loss of sensations.  Patient denies night fever/night sweats, urinary incontinence, bowel incontinence, and significant weight loss.      Pain Disability Index Review:         2/19/2024    10:35 AM   Last 3 PDI Scores   Pain Disability Index (PDI) 70       Non-Pharmacologic Treatments:  Physical Therapy/Home Exercise: yes  Ice/Heat:yes  TENS: no  Acupuncture: no  Massage: no  Chiropractic: no    Other: no      Pain Medications:  - Adjuvant Medications: Neurontin (Gabapentin), Celebrex    Pain Procedures:   None    Past Medical History: " "  Diagnosis Date    Diabetes mellitus     Undifferentiated inflammatory arthritis      Past Surgical History:   Procedure Laterality Date     SECTION      fibroid removal      HYSTERECTOMY       Review of patient's allergies indicates:   Allergen Reactions    Doxycycline Itching, Swelling and Rash    Metformin      Other reaction(s): Not available    Zithromax [azithromycin] Hives, Itching and Swelling       Current Outpatient Medications   Medication Sig    BD VEO INSULIN SYRINGE UF 0.3 mL 31 gauge x 15/64" Syrg USE TWICE A DAY AS DIRECTED    gabapentin (NEURONTIN) 300 MG capsule Take 1 capsule twice a day by oral route.    insulin (LANTUS SOLOSTAR U-100 INSULIN) glargine 100 units/mL SubQ pen 30 units every 24 hours by sub-q route.    insulin  unit/mL injection Inject into the skin 2 (two) times daily before meals.    insulin NPH-insulin regular, 70/30, 100 unit/mL (70-30) injection Humulin 70/30 U-100 Insulin 100 unit/mL subcutaneous suspension   Inject 30 units twice a day by subcutaneous route.    mupirocin (BACTROBAN) 2 % ointment Apply topically 3 (three) times daily. To abdomen    nystatin-triamcinolone (MYCOLOG II) cream Apply to affected area 2 times daily    TRUE METRIX GLUCOSE TEST STRIP Strp USE 1 STRIP TO CHECK GLUCOSE 3 TIMES A DAY     No current facility-administered medications for this visit.       Review of Systems     GENERAL:  No weight loss, malaise or fevers.  HEENT:   No recent changes in vision or hearing  NECK:  Negative for lumps, no difficulty with swallowing.  RESPIRATORY:  Negative for cough, wheezing or shortness of breath, patient denies any recent URI.  CARDIOVASCULAR:  Negative for chest pain or palpitations.  GI:  Negative for abdominal discomfort, blood in stools or black stools or change in bowel habits.  MUSCULOSKELETAL:  See HPI.  SKIN:  Negative for lesions, rash, and itching.  PSYCH:  No mood disorder or recent psychosocial stressors.   HEMATOLOGY/LYMPHOLOGY:  " "Negative for prolonged bleeding, bruising easily or swollen nodes.    NEURO:   No history of syncope, paralysis, seizures or tremors.  All other reviewed and negative other than HPI.    OBJECTIVE:    BP (!) 150/87   Pulse (P) 86   Resp 17   Ht 5' 5" (1.651 m)   Wt 80 kg (176 lb 5.9 oz)   LMP 12/20/2013   BMI 29.35 kg/m²       Physical Exam    GENERAL: Well appearing, in no acute distress, alert and oriented x3.  PSYCH:  Mood and affect appropriate.  SKIN: Skin color, texture, turgor normal, no rashes or lesions.  HEAD/FACE:  Normocephalic, atraumatic. Cranial nerves grossly intact.    CV: RRR with palpation of the radial artery.  PULM: No evidence of respiratory difficulty, symmetric chest rise.  GI:  Soft and non-tender.    BACK: Straight leg raising in the sitting and supine positions is negative to radicular pain.  pain to palpation over the facet joints of the lumbar spine or spinous processes. Normal range of motion without pain reproduction.  EXTREMITIES: Peripheral joint ROM is full and pain free without obvious instability or laxity in all four extremities. No deformities, edema, or skin discoloration. Good capillary refill.  MUSCULOSKELETAL: Able to stand on heels & toes.   Shoulder, hip, and knee provocative maneuvers are negative.  There is no pain with palpation over the sacroiliac joints bilaterally.  Gaenslen's, Distraction/Compression and  FABERs test is negative.  Facet loading test is positive bilaterally.   Bilateral upper and lower extremity strength is normal and symmetric.  No atrophy or tone abnormalities are noted.    RIGHT Lower extremity: Hip flexion 5/5, Hip Abduction 5/5, Hip Adduction 5/5, Knee extension 5/5, Knee flexion 5/5, Ankle dorsiflexion5/5, Extensor hallucis longus 5/5, Ankle plantarflexion 5/5  LEFT Lower extremity:  Hip flexion 5/5, Hip Abduction 5/5,Hip Adduction 5/5, Knee extension 5/5, Knee flexion 5/5, Ankle dorsiflexion 5/5, Extensor hallucis longus 5/5, Ankle " plantarflexion 5/5  -Normal testing knee (patellar) jerk and ankle (achilles) jerk    NEURO: Bilateral upper and lower extremity coordination and muscle stretch reflexes are physiologic and symmetric. No loss of sensation is noted.  GAIT: normal.    Imaging:     X-ray right hip 06/17/2023  Discussion:  There is no fracture or malalignment. The soft tissues are unremarkable.     There are vascular calcifications.     MRI sacroiliac joints 12/20/2023  FINDINGS: The sacroiliac joints are normal and bilaterally symmetric. Normal bone marrow signal. Normal uniform SI joint width. No joint effusion. No erosions. No sclerosis. No spurring. No periarticular soft tissue swelling. No abnormal postcontrast enhancement. No bone lesion.     ASSESSMENT: 58 y.o. year old female with     1. Lumbar facet arthropathy        2. Greater trochanteric bursitis of both hips        3. Myofascial pain              PLAN:   - Interventions:  We discussed at length consideration of bilateral L3-5 lumbar medial branch block for axial back pain, bilateral greater trochanteric bursa injection for bursitis versus lumbar epidural for radiculopathy. Explained the risks and benefits of the procedure in detail with the patient today in clinic along with alternative treatment options, and the patient deferred the intervention at this time.      - Anticoagulation use: No no anticoagulation     report:  Reviewed and consistent with medication use as prescribed.    - Medications:  -We discussed increasing gabapentin medication to a more therapeutic goal.  We discussed the mechanism of action of this medication along with potential side effects and potential pain relief.  Patient would like to continue pharmacologic management per Dr. Reilly.    - Therapy:   We discussed initiating aquatic physical therapy to help manage the patient/s painful condition. The patient was counseled that muscle strengthening will improve the long term prognosis in regards to  pain and may also help increase range of motion and mobility. They were told that one of the goals of physical therapy is that they learn how to do the exercises so that they can do them independently at home daily upon completion. The patient's questions were answered and patient deferred at this time.    - Imaging: Reviewed available imaging with patient and answered any questions they had regarding study.  We discussed considering a lumbar x-ray and lumbar MRI to better evaluate pain, lumbar radiculopathy and weakness.  Patient deferred at this time.    - Follow up visit: PRN      The above plan and management options were discussed at length with patient. Patient is in agreement with the above and verbalized understanding.    - I discussed the goals of interventional chronic pain management with the patient on today's visit. We discussed a multimodal and systematic approach to pain.  This includes diagnostic and therapeutic injections, adjuvant pharmacologic treatment, physical therapy, and at times psychiatry.  I emphasized the importance of regular exercise, core strengthening and stretching, diet and weight loss as a cornerstone of long-term pain management.    - This condition does not require this patient to take time off of work, and the primary goal of our Pain Management services is to improve the patient's functional capacity.  - Patient Questions: Answered all of the patient's questions regarding diagnoses, therapy, treatment and next steps        Catarino Wright MD  Interventional Pain Management  [facility]    Disclaimer:  This note was prepared using voice recognition system and is likely to have sound alike errors that may have been overlooked even after proof reading.  Please call me with any questions

## 2024-02-19 ENCOUNTER — OFFICE VISIT (OUTPATIENT)
Dept: PAIN MEDICINE | Facility: CLINIC | Age: 59
End: 2024-02-19
Payer: COMMERCIAL

## 2024-02-19 VITALS
DIASTOLIC BLOOD PRESSURE: 87 MMHG | SYSTOLIC BLOOD PRESSURE: 150 MMHG | BODY MASS INDEX: 29.38 KG/M2 | RESPIRATION RATE: 17 BRPM | WEIGHT: 176.38 LBS | HEIGHT: 65 IN

## 2024-02-19 DIAGNOSIS — M70.61 GREATER TROCHANTERIC BURSITIS OF BOTH HIPS: ICD-10-CM

## 2024-02-19 DIAGNOSIS — M47.816 LUMBAR FACET ARTHROPATHY: Primary | ICD-10-CM

## 2024-02-19 DIAGNOSIS — M70.62 GREATER TROCHANTERIC BURSITIS OF BOTH HIPS: ICD-10-CM

## 2024-02-19 DIAGNOSIS — M79.18 MYOFASCIAL PAIN: ICD-10-CM

## 2024-02-19 PROCEDURE — 99204 OFFICE O/P NEW MOD 45 MIN: CPT | Mod: S$GLB,,, | Performed by: ANESTHESIOLOGY

## 2024-02-19 PROCEDURE — 99999 PR PBB SHADOW E&M-EST. PATIENT-LVL III: CPT | Mod: PBBFAC,,, | Performed by: ANESTHESIOLOGY

## 2024-03-04 ENCOUNTER — HOSPITAL ENCOUNTER (OUTPATIENT)
Dept: RADIOLOGY | Facility: HOSPITAL | Age: 59
Discharge: HOME | End: 2024-03-04
Payer: COMMERCIAL

## 2024-03-04 VITALS — BODY MASS INDEX: 29.38 KG/M2 | HEIGHT: 65 IN | WEIGHT: 176.38 LBS

## 2024-03-04 DIAGNOSIS — Z12.31 ENCOUNTER FOR SCREENING MAMMOGRAM FOR MALIGNANT NEOPLASM OF BREAST: ICD-10-CM

## 2024-03-04 PROCEDURE — 77063 BREAST TOMOSYNTHESIS BI: CPT | Mod: 26,,, | Performed by: RADIOLOGY

## 2024-03-04 PROCEDURE — 77067 SCR MAMMO BI INCL CAD: CPT | Mod: TC

## 2024-03-04 PROCEDURE — 77067 SCR MAMMO BI INCL CAD: CPT | Mod: 26,,, | Performed by: RADIOLOGY

## 2024-03-05 ENCOUNTER — TELEPHONE (OUTPATIENT)
Dept: RADIOLOGY | Facility: HOSPITAL | Age: 59
End: 2024-03-05

## 2024-03-08 ENCOUNTER — TELEPHONE (OUTPATIENT)
Dept: RADIOLOGY | Facility: HOSPITAL | Age: 59
End: 2024-03-08
Payer: COMMERCIAL

## 2024-03-08 ENCOUNTER — HOSPITAL ENCOUNTER (OUTPATIENT)
Dept: RADIOLOGY | Facility: HOSPITAL | Age: 59
Discharge: HOME | End: 2024-03-08
Payer: COMMERCIAL

## 2024-03-08 DIAGNOSIS — R92.8 ABNORMAL MAMMOGRAM: Primary | ICD-10-CM

## 2024-03-08 DIAGNOSIS — R92.8 ABNORMAL MAMMOGRAM: ICD-10-CM

## 2024-03-08 PROCEDURE — 77061 BREAST TOMOSYNTHESIS UNI: CPT | Mod: TC,RT

## 2024-03-08 PROCEDURE — 77065 DX MAMMO INCL CAD UNI: CPT | Mod: 26,RT,, | Performed by: RADIOLOGY

## 2024-03-08 PROCEDURE — 77061 BREAST TOMOSYNTHESIS UNI: CPT | Mod: 26,RT,, | Performed by: RADIOLOGY

## 2024-03-08 PROCEDURE — 77065 DX MAMMO INCL CAD UNI: CPT | Mod: TC,RT

## 2024-03-08 NOTE — TELEPHONE ENCOUNTER
Stereotactic core biopsy scheduled for 3/19/24 at 10:30, arrival time 10am. Biopsy instructions given with understandings verbalized. Patient has my contact information.

## 2024-04-11 ENCOUNTER — HOSPITAL ENCOUNTER (OUTPATIENT)
Dept: RADIOLOGY | Facility: HOSPITAL | Age: 59
Discharge: HOME | End: 2024-04-11
Payer: COMMERCIAL

## 2024-04-11 DIAGNOSIS — R92.8 ABNORMAL MAMMOGRAM: ICD-10-CM

## 2024-04-11 PROCEDURE — 19081 BX BREAST 1ST LESION STRTCTC: CPT | Mod: RT,,, | Performed by: RADIOLOGY

## 2024-04-11 PROCEDURE — 88305 TISSUE EXAM BY PATHOLOGIST: CPT | Performed by: PATHOLOGY

## 2024-04-11 PROCEDURE — 19081 BX BREAST 1ST LESION STRTCTC: CPT | Mod: RT

## 2024-04-11 PROCEDURE — 88305 TISSUE EXAM BY PATHOLOGIST: CPT | Mod: 26,,, | Performed by: PATHOLOGY

## 2024-04-11 NOTE — LETTER
[facility]  [address] [facility] [address]  Phone: [phone]  Fax: [phone]           Patient: Brissa Lopez   YOB: 1965   Today's Date: April 11, 2024       To Whom It May Concern:    This notice verifies that Brissa Lopez was seen  on 4/11/2024.         Sincerely,    Jessica Alex, RT

## 2024-04-16 LAB
FINAL PATHOLOGIC DIAGNOSIS: NORMAL
GROSS: NORMAL
Lab: NORMAL

## 2024-04-17 ENCOUNTER — TELEPHONE (OUTPATIENT)
Dept: SURGERY | Facility: CLINIC | Age: 59
End: 2024-04-17
Payer: COMMERCIAL

## 2024-04-17 NOTE — TELEPHONE ENCOUNTER
Called patient to discuss benign breast biopsy results- we reviewed the pathology report. Pt verbalized understanding of all information. Pt states everything is healing well at this time and denies any further needs.      ----- Message from Dimitry Wilcox MD sent at 4/16/2024  2:56 PM CDT -----  Benign and concordant.

## 2024-05-20 ENCOUNTER — TELEPHONE (OUTPATIENT)
Dept: PAIN MEDICINE | Facility: CLINIC | Age: 59
End: 2024-05-20
Payer: COMMERCIAL

## 2024-05-20 NOTE — TELEPHONE ENCOUNTER
Called patient in regards to a call back. Patient stated she would like to be scheduled for a sooner follow up appointment. Patient has been scheduled on 05/21/24 jaime Blake. Patient voiced understanding.    Chelsie Polk MA

## 2024-05-21 ENCOUNTER — OFFICE VISIT (OUTPATIENT)
Dept: PAIN MEDICINE | Facility: CLINIC | Age: 59
End: 2024-05-21
Payer: COMMERCIAL

## 2024-05-21 VITALS
WEIGHT: 176.38 LBS | SYSTOLIC BLOOD PRESSURE: 131 MMHG | DIASTOLIC BLOOD PRESSURE: 74 MMHG | HEIGHT: 65 IN | HEART RATE: 94 BPM | BODY MASS INDEX: 29.38 KG/M2

## 2024-05-21 DIAGNOSIS — G89.29 ACUTE EXACERBATION OF CHRONIC LOW BACK PAIN: ICD-10-CM

## 2024-05-21 DIAGNOSIS — R10.31 RIGHT GROIN PAIN: ICD-10-CM

## 2024-05-21 DIAGNOSIS — M54.50 ACUTE EXACERBATION OF CHRONIC LOW BACK PAIN: ICD-10-CM

## 2024-05-21 DIAGNOSIS — M47.816 LUMBAR SPONDYLOSIS: Primary | ICD-10-CM

## 2024-05-21 DIAGNOSIS — M54.59 LUMBAR FACET JOINT PAIN: ICD-10-CM

## 2024-05-21 PROCEDURE — 99214 OFFICE O/P EST MOD 30 MIN: CPT | Mod: 25,S$GLB,, | Performed by: PHYSICIAN ASSISTANT

## 2024-05-21 PROCEDURE — 99999 PR PBB SHADOW E&M-EST. PATIENT-LVL IV: CPT | Mod: PBBFAC,,, | Performed by: PHYSICIAN ASSISTANT

## 2024-05-21 PROCEDURE — 96372 THER/PROPH/DIAG INJ SC/IM: CPT | Mod: S$GLB,,, | Performed by: PHYSICAL MEDICINE & REHABILITATION

## 2024-05-21 RX ORDER — METHYLPREDNISOLONE 4 MG/1
TABLET ORAL
Qty: 21 EACH | Refills: 0 | Status: SHIPPED | OUTPATIENT
Start: 2024-05-21

## 2024-05-21 RX ORDER — MELOXICAM 7.5 MG/1
7.5 TABLET ORAL 2 TIMES DAILY PRN
Qty: 60 TABLET | Refills: 0 | Status: SHIPPED | OUTPATIENT
Start: 2024-05-21

## 2024-05-21 RX ORDER — KETOROLAC TROMETHAMINE 30 MG/ML
30 INJECTION, SOLUTION INTRAMUSCULAR; INTRAVENOUS
Status: COMPLETED | OUTPATIENT
Start: 2024-05-21 | End: 2024-05-21

## 2024-05-21 RX ORDER — ATORVASTATIN CALCIUM 20 MG/1
20 TABLET, FILM COATED ORAL
COMMUNITY
Start: 2024-05-16

## 2024-05-21 RX ADMIN — KETOROLAC TROMETHAMINE 30 MG: 30 INJECTION, SOLUTION INTRAMUSCULAR; INTRAVENOUS at 08:05

## 2024-11-12 DIAGNOSIS — Z12.31 ENCOUNTER FOR SCREENING MAMMOGRAM FOR BREAST CANCER: Primary | ICD-10-CM

## 2024-11-13 ENCOUNTER — TELEPHONE (OUTPATIENT)
Dept: OBSTETRICS AND GYNECOLOGY | Facility: CLINIC | Age: 59
End: 2024-11-13
Payer: COMMERCIAL

## 2024-11-13 NOTE — TELEPHONE ENCOUNTER
Called pt to schedule mammogram no answer, VM not ,setup order placed     Kerri WHEAT LPN  OB/GYN

## 2024-11-13 NOTE — TELEPHONE ENCOUNTER
----- Message from Nurse Josephine sent at 11/7/2024  3:42 PM CST -----  [facility] Staff  Caller: Brissa (Today,  3:23 PM)  Pt is calling in regards to getting an order in so that she can scheduled her mammo.please call back at .[phone]      Thanks  SUMMER

## 2025-07-23 ENCOUNTER — HOSPITAL ENCOUNTER (OUTPATIENT)
Dept: RADIOLOGY | Facility: HOSPITAL | Age: 60
Discharge: HOME | End: 2025-07-23
Payer: COMMERCIAL

## 2025-07-23 VITALS — HEIGHT: 65 IN | WEIGHT: 176.38 LBS | BODY MASS INDEX: 29.38 KG/M2

## 2025-07-23 DIAGNOSIS — Z12.31 ENCOUNTER FOR SCREENING MAMMOGRAM FOR BREAST CANCER: ICD-10-CM

## 2025-07-23 PROCEDURE — 77063 BREAST TOMOSYNTHESIS BI: CPT | Mod: 26,,, | Performed by: RADIOLOGY

## 2025-07-23 PROCEDURE — 77067 SCR MAMMO BI INCL CAD: CPT | Mod: 26,,, | Performed by: RADIOLOGY

## 2025-07-23 PROCEDURE — 77067 SCR MAMMO BI INCL CAD: CPT | Mod: TC
